# Patient Record
Sex: FEMALE | Race: BLACK OR AFRICAN AMERICAN
[De-identification: names, ages, dates, MRNs, and addresses within clinical notes are randomized per-mention and may not be internally consistent; named-entity substitution may affect disease eponyms.]

---

## 2017-02-21 ENCOUNTER — HOSPITAL ENCOUNTER (EMERGENCY)
Dept: HOSPITAL 62 - ER | Age: 32
Discharge: HOME | End: 2017-02-21
Payer: OTHER GOVERNMENT

## 2017-02-21 VITALS — SYSTOLIC BLOOD PRESSURE: 158 MMHG | DIASTOLIC BLOOD PRESSURE: 92 MMHG

## 2017-02-21 DIAGNOSIS — I10: ICD-10-CM

## 2017-02-21 DIAGNOSIS — R07.89: Primary | ICD-10-CM

## 2017-02-21 DIAGNOSIS — R06.02: ICD-10-CM

## 2017-02-21 DIAGNOSIS — R11.0: ICD-10-CM

## 2017-02-21 DIAGNOSIS — F41.9: ICD-10-CM

## 2017-02-21 DIAGNOSIS — R51: ICD-10-CM

## 2017-02-21 DIAGNOSIS — F17.200: ICD-10-CM

## 2017-02-21 DIAGNOSIS — R00.2: ICD-10-CM

## 2017-02-21 LAB
ALBUMIN SERPL-MCNC: 4.3 G/DL (ref 3.5–5)
ALP SERPL-CCNC: 60 U/L (ref 38–126)
ALT SERPL-CCNC: 36 U/L (ref 9–52)
ANION GAP SERPL CALC-SCNC: 12 MMOL/L (ref 5–19)
AST SERPL-CCNC: 23 U/L (ref 14–36)
BASOPHILS # BLD AUTO: 0.1 10^3/UL (ref 0–0.2)
BASOPHILS NFR BLD AUTO: 1.1 % (ref 0–2)
BILIRUB DIRECT SERPL-MCNC: 0 MG/DL (ref 0–0.3)
BILIRUB SERPL-MCNC: 0.4 MG/DL (ref 0.2–1.3)
BUN SERPL-MCNC: 12 MG/DL (ref 7–20)
CALCIUM: 10.7 MG/DL (ref 8.4–10.2)
CHLORIDE SERPL-SCNC: 102 MMOL/L (ref 98–107)
CK MB SERPL-MCNC: 0.31 NG/ML (ref ?–4.55)
CK SERPL-CCNC: 122 U/L (ref 30–135)
CO2 SERPL-SCNC: 26 MMOL/L (ref 22–30)
CREAT SERPL-MCNC: 0.88 MG/DL (ref 0.52–1.25)
EOSINOPHIL # BLD AUTO: 0.2 10^3/UL (ref 0–0.6)
EOSINOPHIL NFR BLD AUTO: 1.9 % (ref 0–6)
ERYTHROCYTE [DISTWIDTH] IN BLOOD BY AUTOMATED COUNT: 13.6 % (ref 11.5–14)
GLUCOSE SERPL-MCNC: 80 MG/DL (ref 75–110)
HCT VFR BLD CALC: 45.4 % (ref 36–47)
HGB BLD-MCNC: 14.9 G/DL (ref 12–15.5)
HGB HCT DIFFERENCE: -0.7
LYMPHOCYTES # BLD AUTO: 4.2 10^3/UL (ref 0.5–4.7)
LYMPHOCYTES NFR BLD AUTO: 32.1 % (ref 13–45)
MCH RBC QN AUTO: 29.8 PG (ref 27–33.4)
MCHC RBC AUTO-ENTMCNC: 32.8 G/DL (ref 32–36)
MCV RBC AUTO: 91 FL (ref 80–97)
MONOCYTES # BLD AUTO: 1 10^3/UL (ref 0.1–1.4)
MONOCYTES NFR BLD AUTO: 7.6 % (ref 3–13)
NEUTROPHILS # BLD AUTO: 7.4 10^3/UL (ref 1.7–8.2)
NEUTS SEG NFR BLD AUTO: 57.3 % (ref 42–78)
POTASSIUM SERPL-SCNC: 4.4 MMOL/L (ref 3.6–5)
PROT SERPL-MCNC: 7.4 G/DL (ref 6.3–8.2)
RBC # BLD AUTO: 5 10^6/UL (ref 3.72–5.28)
SODIUM SERPL-SCNC: 139.7 MMOL/L (ref 137–145)
TROPONIN I SERPL-MCNC: < 0.012 NG/ML
WBC # BLD AUTO: 12.9 10^3/UL (ref 4–10.5)

## 2017-02-21 PROCEDURE — 82550 ASSAY OF CK (CPK): CPT

## 2017-02-21 PROCEDURE — 93005 ELECTROCARDIOGRAM TRACING: CPT

## 2017-02-21 PROCEDURE — 99285 EMERGENCY DEPT VISIT HI MDM: CPT

## 2017-02-21 PROCEDURE — 80053 COMPREHEN METABOLIC PANEL: CPT

## 2017-02-21 PROCEDURE — 36415 COLL VENOUS BLD VENIPUNCTURE: CPT

## 2017-02-21 PROCEDURE — 84484 ASSAY OF TROPONIN QUANT: CPT

## 2017-02-21 PROCEDURE — 82553 CREATINE MB FRACTION: CPT

## 2017-02-21 PROCEDURE — 71010: CPT

## 2017-02-21 PROCEDURE — 85025 COMPLETE CBC W/AUTO DIFF WBC: CPT

## 2017-02-21 PROCEDURE — 93010 ELECTROCARDIOGRAM REPORT: CPT

## 2017-02-21 NOTE — EKG REPORT
SEVERITY:- ABNORMAL ECG -

SINUS RHYTHM

CONSIDER LEFT VENTRICULAR HYPERTROPHY

:

Confirmed by: Anni Hernadez MD 21-Feb-2017 22:00:31

## 2017-02-21 NOTE — ER DOCUMENT REPORT
Doctor's Note


Notes: 





02/21/17 15:11


31-year-old female reports many month history of vague anterior chest 

discomfort which she says sometimes wakes her from sleep.  She reports on 

occasion she feels short of breath with it but denies any associated nausea or 

diaphoresis.  He reports no sensation wasn't exerting or eating.


On exam chest is clear to auscultation bilateral breath sounds equal nontender 

to palpation


Heart regular rate and rhythm


X-rays warm to plus pulses

## 2017-02-21 NOTE — ER DOCUMENT REPORT
ED Cardiac





- General


Chief Complaint: Chest Pain


Stated Complaint: CHEST PAIN


Notes: 


Patient is a 31-year-old female presents emergency Department complaining of 

chest pain and was referred over by her [facility] physician.  Patient states that 

she has had a history of this for a long time but is been worse over the past 3 

days.  Patient states that she has left chest pain.  With pressure that lasts 

for hours is alleviated by sleeping.  Patient does admit that she has a history 

of anxiety.  She denies any previous cardiac history.  Patient denies any 

nausea or vomiting associated with this but does admit to shortness of breath 

and headache.





Past medical history significant for migraines, hypertension, anxiety


Surgical history


Social history social tobacco user, denies alcohol or drug use.  No allergies


TRAVEL OUTSIDE OF THE [place] IN LAST 30 DAYS: No





- Related Data


Allergies/Adverse Reactions: 


 





No Known Allergies Allergy (Verified 02/16/13 22:38)


 











Past Medical History





- Social History


Smoking Status: Current Every Day Smoker


Family History: Reviewed & Not Pertinent





- Past Medical History


Cardiac Medical History: Reports: Hx Hypertension


   Denies: Hx Coronary Artery Disease, Hx Heart Attack


Pulmonary Medical History: 


   Denies: Hx Asthma, Hx Bronchitis, Hx COPD, Hx Pneumonia


Neurological Medical History: Denies: Hx Cerebrovascular Accident, Hx Seizures


Musculoskeltal Medical History: Denies Hx Arthritis


Psychiatric Medical History: Reports: Hx Depression


Surgical Hx: Negative


Past Surgical History: Denies: Hx Hysterectomy





- Immunizations


Immunizations up to date: No


Hx Diphtheria, Pertussis, Tetanus Vaccination: Yes





Review of Systems





- Review of Systems


Constitutional: No symptoms reported


EENT: No symptoms reported


Cardiovascular: Chest pain, Palpitations


Respiratory: Short of breath


Gastrointestinal: Nausea


Genitourinary: No symptoms reported


Female Genitourinary: No symptoms reported


Musculoskeletal: No symptoms reported


Skin: No symptoms reported


Hematologic/Lymphatic: No symptoms reported


Neurological/Psychological: No symptoms reported





Physical Exam





- Vital signs


Vitals: 





 











Pulse Ox


 


 99 


 


 02/21/17 11:50














- Notes


Notes: 


PHYSICAL EXAM


GENERAL: Alert, interacts well. 


HEAD: Normocephalic, atraumatic.


EYES: Pupils equal, round, and reactive to light. Extraocular movements intact.


ENT: Oral mucosa moist, tongue midline. 


NECK: Full range of motion. Supple. Trachea midline.


LUNGS: Clear to auscultation bilaterally, no wheezes, rales, or rhonchi. No 

respiratory distress.


HEART: Regular rate and rhythm. No murmurs, gallops, or rubs.


ABDOMEN: Soft,  nondistended, nontender. No guarding, rebound, or rigidity.. 

Bowel sounds present in all 4 quadrants.


EXTREMITIES: Moves all 4 extremities spontaneously. No edema, radial and 

dorsalis pedis pulses 2/4 bilaterally. No cyanosis. 


NEUROLOGICAL: Alert and oriented x3. Normal speech.


PSYCH: Normal affect, normal mood.


SKIN: Warm, dry, normal turgor. No rashes or lesions noted.





Course





- Re-evaluation


Re-evalutation: 





02/21/17 14:08


Patient is very well in appearance, vitals within normal limits.  Low clinical 

suspicion for ACS given clinical history, exam, EKG without ST elevations or 

depressions, and negative initial troponin. HEART score less than or equal to 

3.  PE also seems unlikely given clinical history, absence of tachycardia or 

dyspnea. Well's score of 0. CXR without evidence of pneumothorax or pneumonia. 

No widened mediastinum. Aortic dissection also seems unlikely given history, 

symmetric pulses, CXR, and vitals. At this time will discharge with return 

precautions and follow-up recommendations.  Verbal discharge instructions given 

a the bedside and opportunity for questions given. Medication warnings 

reviewed. Patient is in agreement with this plan and has verbalized 

understanding of return precautions and the need for primary care follow-up in 

the next 24-72 hours.





- Vital Signs


Vital signs: 





 











Temp Pulse Resp BP Pulse Ox


 


 98.1 F      14   133/99 H  100 


 


 02/21/17 11:55     02/21/17 11:55  02/21/17 11:55  02/21/17 11:55














- Laboratory


Result Diagrams: 


 02/21/17 11:47





 02/21/17 11:47


Laboratory results interpreted by me: 





 











  02/21/17 02/21/17





  11:47 11:47


 


WBC  12.9 H 


 


Calcium   10.7 H














- Diagnostic Test


Radiology reviewed: Image reviewed, Reports reviewed





- EKG Interpretation by Me


EKG shows normal: Sinus rhythm


Rate: Normal


Rhythm: NSR


When compared to previous EKG there are: No significant change





Discharge





- Discharge


Clinical Impression: 


 Anxiety





Chest pain


Qualifiers:


 Chest pain type: unspecified Qualified Code(s): R07.9 - Chest pain, unspecified





Condition: Good


Disposition: HOME, SELF-CARE


Instructions:  Anxiety (Formerly [facility], later [facility])


Additional Instructions: 


NORMAL EXAM AND WORKUP:


     At this time, your examination and workup show no significant abnormality.

  No significant abnormal physical findings were noted.  All laboratory, EKG, 

and imaging (x-ray, CT scans, ultrasound) studies that were ordered show no 

significant abnormality.


     Although your examination and all studies that were ordered showed no 

significant abnormal finding, there are no examinations and no studies that are 

100% accurate.  There is always the possibility that some abnormality could 

exist and not be detected with physical examination or within the limits and 

capabilities of laboratory and other studies.


     You should return or follow up as you were instructed on your visit today 

for further evaluation if your symptoms do not resolve.











FOLLOW-UP CARE:


If you have been referred to a physician for follow-up care, call the physician

s office for an appointment as you were instructed or within the next two days.

  If you experience worsening or a significant change in your symptoms, notify 

the physician immediately or return to the Emergency Department at any time for 

re-evaluation.


Forms:  Return to Work

## 2018-08-01 ENCOUNTER — HOSPITAL ENCOUNTER (EMERGENCY)
Dept: HOSPITAL 62 - ER | Age: 33
Discharge: HOME | End: 2018-08-01
Payer: COMMERCIAL

## 2018-08-01 VITALS — DIASTOLIC BLOOD PRESSURE: 97 MMHG | SYSTOLIC BLOOD PRESSURE: 161 MMHG

## 2018-08-01 DIAGNOSIS — M25.562: ICD-10-CM

## 2018-08-01 DIAGNOSIS — M54.5: Primary | ICD-10-CM

## 2018-08-01 DIAGNOSIS — F17.200: ICD-10-CM

## 2018-08-01 DIAGNOSIS — I10: ICD-10-CM
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
## 2018-08-01 DIAGNOSIS — M79.662: ICD-10-CM

## 2018-08-01 PROCEDURE — 72110 X-RAY EXAM L-2 SPINE 4/>VWS: CPT

## 2018-08-01 PROCEDURE — 99283 EMERGENCY DEPT VISIT LOW MDM: CPT

## 2018-08-01 PROCEDURE — 73590 X-RAY EXAM OF LOWER LEG: CPT
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
## 2018-11-07 ENCOUNTER — HOSPITAL ENCOUNTER (OUTPATIENT)
Dept: HOSPITAL 62 - END | Age: 33
Discharge: HOME | End: 2018-11-07
Attending: INTERNAL MEDICINE
Payer: OTHER GOVERNMENT

## 2018-11-07 VITALS — DIASTOLIC BLOOD PRESSURE: 99 MMHG | SYSTOLIC BLOOD PRESSURE: 162 MMHG

## 2018-11-07 DIAGNOSIS — K92.1: Primary | ICD-10-CM

## 2018-11-07 DIAGNOSIS — Z79.899: ICD-10-CM

## 2018-11-07 DIAGNOSIS — I10: ICD-10-CM

## 2018-11-07 DIAGNOSIS — E66.9: ICD-10-CM

## 2018-11-07 DIAGNOSIS — K22.2: ICD-10-CM

## 2018-11-07 DIAGNOSIS — R73.03: ICD-10-CM

## 2018-11-07 DIAGNOSIS — K29.50: ICD-10-CM

## 2018-11-07 PROCEDURE — 88342 IMHCHEM/IMCYTCHM 1ST ANTB: CPT

## 2018-11-07 PROCEDURE — 43239 EGD BIOPSY SINGLE/MULTIPLE: CPT

## 2018-11-07 PROCEDURE — 88305 TISSUE EXAM BY PATHOLOGIST: CPT

## 2018-11-07 RX ADMIN — FENTANYL CITRATE ONE MCG: 50 INJECTION INTRAMUSCULAR; INTRAVENOUS at 09:26

## 2018-11-07 RX ADMIN — MIDAZOLAM HYDROCHLORIDE ONE MG: 1 INJECTION, SOLUTION INTRAMUSCULAR; INTRAVENOUS at 09:24

## 2018-11-07 RX ADMIN — MIDAZOLAM HYDROCHLORIDE ONE MG: 1 INJECTION, SOLUTION INTRAMUSCULAR; INTRAVENOUS at 09:20

## 2018-11-07 RX ADMIN — FENTANYL CITRATE ONE MCG: 50 INJECTION INTRAMUSCULAR; INTRAVENOUS at 09:22

## 2018-11-07 NOTE — OPERATIVE REPORT
Operative Report


DATE OF SURGERY: 11/07/18


Operative Report: 





The risks benefits and alternatives of the procedure explained to the patient 

in detail and informed consent is obtained.A GIF Olympus video scope was 

inserted into the patient's mouth and hypopharynx, the esophagus is identified 

intubated and insufflated, the scope was then advanced through the esophagus 

stomach and duodenum, retroflexion maneuver is done. the esophagus stomach and 

first and second portions of the duodenum examined


PREOPERATIVE DIAGNOSIS: Dysphagia, dyspepsia


POSTOPERATIVE DIAGNOSIS: Esophagitis biopsies obtained to rule out eosinophilic 

esophagitis.  Schatzki's ring that is broken.  Gastritis status post biopsy 

rule out Helicobacter pylori


OPERATION: EGD with biopsy


SURGEON: GAETANO WEBBER


ANESTHESIA: Moderate Sedation - 4 mg of Versed, 100 mcg of fentanyl.  Conscious 

sedation monitoring time 30 minutes.


TISSUE REMOVED OR ALTERED: As noted above.


COMPLICATIONS: 





None.


ESTIMATED BLOOD LOSS: None.


INTRAOPERATIVE FINDINGS: As noted above.


PROCEDURE: 





Patient tolerated the procedure well.


No immediate postprocedure complications are noted.


Patient discharged in good condition.


Discharge date 11/7/2018.


Discharge diet: Regular.


Discharge activity: Regular.


2-3-week follow-up to discuss findings.  Patient is instructed to call the 

office or go to the emergency room should there be any further problems or 

questions.


Wait on the pathology.